# Patient Record
(demographics unavailable — no encounter records)

---

## 2025-07-02 NOTE — IMPRESSION
[_____] : grasses ([unfilled]) [Allergy Testing Dust Mite] : dust mites [Allergy Testing Mixed Feathers] : feathers [Allergy Testing Cockroach] : cockroach [Allergy Testing Dog] : dog [Allergy Testing Cat] : cat [________] : [unfilled]

## 2025-07-02 NOTE — HISTORY OF PRESENT ILLNESS
[de-identified] : In office to be evaluated for allergies and allergic reaction.  Brought to the office by the father, mother on the phone. Allergies: Symptoms with change in season including in the spring and the last few years, consisting of stuffy nose, runny nose, sneezing and sometimes cough, sometimes headaches.  Takes Benadryl or Tylenol as needed.  No nasal spray or eyedrops used.  No frequent antibiotics for respiratory infections.  No chest symptoms or need for inhalers. Medications: Within the year, after having orthodontic work, he complained of discomfort of teeth.  Mother stopped by the pharmacy and purchased ibuprofen, and gave it to him.  It was ibuprofen liquid bubblegum.  Within 5 minutes he had an itchy tongue, itchy throat, and throat clearing.  Mother looked with flashlight and she saw that on the back of his tongue he had raised white bumps.  Stopped at urgent care where he was treated with Benadryl.  Symptoms attributed to bubblegum flavor.  Avoided ibuprofen and Motrin subsequently as well as bubblegum flavor.  Using Tylenol cherry flavor with no symptoms. Ibuprofen bubblegum ingredients: Ibuprofen, acesulfame potassium, anhydrous citric acid, FD&C red 40, glycerin, polysorbate 80, pregelatinized starch, purified water, sodium benzoate, sucrose, xanthan gum. Ibuprofen berry flavor has same ingredients except FD&C Red 40. Tylenol cherry flavor: acetaminophen, anhydrous citric acid, glycerin, microcrystalline cellulose, carboxymethylcellulose sodium, potassium Sorbide, purified water, sorbitol solution, sucralose, sucrose, Zantac and gum, some may contain FD&C red 40.

## 2025-07-02 NOTE — REASON FOR VISIT
[Evaluation/Consultation] : an evaluation/consultation of [Allergic Rhinitis] : allergic rhinitis [To Medication] : allergy to medication [Father] : father [Patient] : patient [Parents] : parents

## 2025-07-02 NOTE — SOCIAL HISTORY
[de-identified] : House with oil baseboard heating, room air conditioning, area rug in the bedroom, 1 dog, fish, no cigarette smoke exposure.

## 2025-07-02 NOTE — PHYSICAL EXAM
[Alert] : alert [No Acute Distress] : no acute distress [Normal Voice/Communication] : normal voice communication [Supple] : the neck was supple [Normal S1, S2] : normal S1 and S2 [Regular Rhythm] : with a regular rhythm [Soft] : abdomen soft [Not Tender] : non-tender [No HSM] : no hepato-splenomegaly [Normal Cervical Lymph Nodes] : cervical [Skin Intact] : skin intact  [No Rash] : no rash [No clubbing] : no clubbing [No Cyanosis] : no cyanosis [Alert, Awake, Oriented as Age-Appropriate] : alert, awake, oriented as age appropriate [de-identified] : Eyes clear. [de-identified] : Throat clear.  Nasal mucosa pink, no stuffiness, no discharge.  Tympanic membranes normal.  No sinus tenderness. [de-identified] : Chest clear, good air entry, no wheezing or crackles.

## 2025-07-02 NOTE — ASSESSMENT
[FreeTextEntry1] : Allergic rhinitis (pollen): Skin testing was performed and was positive to pollens from trees, ragweed and weeds, borderline reaction to Alternaria mold.  Take oral antihistamine and Flonase in spring and fall.  Decrease exposure to pollen.  Itchy mouth and throat with ibuprofen bubblegum flavor. Most likely caused by ibuprofen, not flavoring. Skin testing to ibuprofen liquid berry flavor (does not contain FD&C red 40) was negative.  Suggested oral challenge to ibuprofen.  If patient tolerates at home other product containing FD&C #40, may continue.  Discussed EpiPen with father.  Does not feel it is necessary because patient avoiding ibuprofen.  Never had allergic reaction to anything else.